# Patient Record
(demographics unavailable — no encounter records)

---

## 2025-07-01 NOTE — PHYSICAL EXAM
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

## 2025-07-01 NOTE — HISTORY OF PRESENT ILLNESS
[FreeTextEntry1] : LMP: 5/24/25 x 9 days then had 6/18  39 yo here for abnormal bleeding. Usually monthly menses lasting 6 days, heavy first 2 days, unable to quantify. States had recent episodes where bleeding went onto floor and into toilet. Pt was concerned as blood did not oxidize like it should. States hx of fibroid in past s/p UFE 2019, states was having constipation issues at that time. States was too scared to have surgery to remove fibroid. States had decrease in fibroid size, unsure if improved constipation. Pt does report urinary frequency, denies dysuria.   Started taking Ashwaganda recently for cortisol levels per pt.   Last pap: 2020 - Normal Mammogram - in early 20s - Benign   PObHx: G0 PGynHx: + ovarian cysts/ + fibroids/ +hx of HPV/Abnormal Pap/ + hx of Chlamydia/ denies receiving Gardasil vaccine. Not SA.  PMH: Depression PSH: Uterine fibroid embolization (2019), [place] teeth removal (2005) All: PCN (on allergy testing) Meds: denies FamHx: Uterine Ca - PGM DM- Maternal uncle Heart disease - PGM Social Hx: No tob/drug/ETOH

## 2025-07-01 NOTE — HISTORY OF PRESENT ILLNESS
[FreeTextEntry1] : LMP: 5/24/25 x 9 days then had 6/18  37 yo here for abnormal bleeding. Usually monthly menses lasting 6 days, heavy first 2 days, unable to quantify. States had recent episodes where bleeding went onto floor and into toilet. Pt was concerned as blood did not oxidize like it should. States hx of fibroid in past s/p UFE 2019, states was having constipation issues at that time. States was too scared to have surgery to remove fibroid. States had decrease in fibroid size, unsure if improved constipation. Pt does report urinary frequency, denies dysuria.   Started taking Ashwaganda recently for cortisol levels per pt.   Last pap: 2020 - Normal Mammogram - in early 20s - Benign   PObHx: G0 PGynHx: + ovarian cysts/ + fibroids/ +hx of HPV/Abnormal Pap/ + hx of Chlamydia/ denies receiving Gardasil vaccine. Not SA.  PMH: Depression PSH: Uterine fibroid embolization (2019), [place] teeth removal (2005) All: PCN (on allergy testing) Meds: denies FamHx: Uterine Ca - PGM DM- Maternal uncle Heart disease - PGM Social Hx: No tob/drug/ETOH

## 2025-07-01 NOTE — DISCUSSION/SUMMARY
[FreeTextEntry1] : 39 yo with AUB, hx of fibroid uterus s/p UFE 2019. Enlarged uterus on exam.  -AUB labs -Pap/HPV given has not had for several years -Vaginitis plus panel -Pelvic sono for further evaluation -RTO after ultrasound/labs for follow-up  I spent 49 minutes of total time on the day of the encounter preparing for the visit, review of records, interacting with the patient, EHR documentation, and coordinating care.

## 2025-07-01 NOTE — HISTORY OF PRESENT ILLNESS
[FreeTextEntry1] : LMP: 5/24/25 x 9 days then had 6/18  39 yo here for abnormal bleeding. Usually monthly menses lasting 6 days, heavy first 2 days, unable to quantify. States had recent episodes where bleeding went onto floor and into toilet. Pt was concerned as blood did not oxidize like it should. States hx of fibroid in past s/p UFE 2019, states was having constipation issues at that time. States was too scared to have surgery to remove fibroid. States had decrease in fibroid size, unsure if improved constipation. Pt does report urinary frequency, denies dysuria.   Started taking Ashwaganda recently for cortisol levels per pt.   Last pap: 2020 - Normal Mammogram - in early 20s - Benign   PObHx: G0 PGynHx: + ovarian cysts/ + fibroids/ +hx of HPV/Abnormal Pap/ + hx of Chlamydia/ denies receiving Gardasil vaccine. Not SA.  PMH: Depression PSH: Uterine fibroid embolization (2019), [place] teeth removal (2005) All: PCN (on allergy testing) Meds: denies FamHx: Uterine Ca - PGM DM- Maternal uncle Heart disease - PGM Social Hx: No tob/drug/ETOH

## 2025-07-01 NOTE — DISCUSSION/SUMMARY
[FreeTextEntry1] : 37 yo with AUB, hx of fibroid uterus s/p UFE 2019. Enlarged uterus on exam.  -AUB labs -Pap/HPV given has not had for several years -Vaginitis plus panel -Pelvic sono for further evaluation -RTO after ultrasound/labs for follow-up  I spent 49 minutes of total time on the day of the encounter preparing for the visit, review of records, interacting with the patient, EHR documentation, and coordinating care.